# Patient Record
Sex: MALE | Race: WHITE | NOT HISPANIC OR LATINO | Employment: OTHER | URBAN - METROPOLITAN AREA
[De-identification: names, ages, dates, MRNs, and addresses within clinical notes are randomized per-mention and may not be internally consistent; named-entity substitution may affect disease eponyms.]

---

## 2018-11-28 ENCOUNTER — HOSPITAL ENCOUNTER (OUTPATIENT)
Dept: GENERAL RADIOLOGY | Facility: HOSPITAL | Age: 73
Discharge: HOME | End: 2018-11-28

## 2019-07-28 ENCOUNTER — HOSPITAL ENCOUNTER (EMERGENCY)
Facility: HOSPITAL | Age: 74
Discharge: HOME | End: 2019-07-28
Admitting: EMERGENCY MEDICINE

## 2019-07-28 ENCOUNTER — APPOINTMENT (OUTPATIENT)
Dept: GENERAL RADIOLOGY | Facility: HOSPITAL | Age: 74
End: 2019-07-28

## 2019-07-28 VITALS
OXYGEN SATURATION: 96 % | RESPIRATION RATE: 18 BRPM | HEIGHT: 72 IN | DIASTOLIC BLOOD PRESSURE: 88 MMHG | TEMPERATURE: 98.3 F | BODY MASS INDEX: 29.74 KG/M2 | WEIGHT: 219.58 LBS | SYSTOLIC BLOOD PRESSURE: 168 MMHG | HEART RATE: 71 BPM

## 2019-07-28 DIAGNOSIS — S86.911A KNEE STRAIN, RIGHT, INITIAL ENCOUNTER: Primary | ICD-10-CM

## 2019-07-28 PROCEDURE — 73564 X-RAY EXAM KNEE 4 OR MORE: CPT

## 2019-07-28 PROCEDURE — 99283 EMERGENCY DEPT VISIT LOW MDM: CPT

## 2023-04-27 ENCOUNTER — APPOINTMENT (OUTPATIENT)
Dept: GENERAL RADIOLOGY | Facility: HOSPITAL | Age: 78
End: 2023-04-27
Payer: MEDICARE

## 2023-04-27 ENCOUNTER — HOSPITAL ENCOUNTER (EMERGENCY)
Facility: HOSPITAL | Age: 78
Discharge: HOME | End: 2023-04-27
Attending: EMERGENCY MEDICINE
Payer: MEDICARE

## 2023-04-27 VITALS
SYSTOLIC BLOOD PRESSURE: 140 MMHG | DIASTOLIC BLOOD PRESSURE: 68 MMHG | OXYGEN SATURATION: 99 % | BODY MASS INDEX: 32 KG/M2 | RESPIRATION RATE: 17 BRPM | HEIGHT: 69 IN | WEIGHT: 216.05 LBS | TEMPERATURE: 99 F | HEART RATE: 91 BPM

## 2023-04-27 DIAGNOSIS — S80.01XA CONTUSION OF RIGHT KNEE, INITIAL ENCOUNTER: ICD-10-CM
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
## 2023-04-27 DIAGNOSIS — S50.02XA CONTUSION OF LEFT ELBOW, INITIAL ENCOUNTER: Primary | ICD-10-CM

## 2023-04-27 PROCEDURE — 99282 EMERGENCY DEPT VISIT SF MDM: CPT

## 2023-04-27 PROCEDURE — 90715 TDAP VACCINE 7 YRS/> IM: CPT | Performed by: NURSE PRACTITIONER

## 2023-04-27 PROCEDURE — 73070 X-RAY EXAM OF ELBOW: CPT

## 2023-04-27 PROCEDURE — 90471 IMMUNIZATION ADMIN: CPT | Performed by: NURSE PRACTITIONER

## 2023-04-27 PROCEDURE — 25010000002 TETANUS-DIPHTH-ACELL PERTUSSIS 5-2.5-18.5 LF-MCG/0.5 SUSPENSION PREFILLED SYRINGE: Performed by: NURSE PRACTITIONER

## 2023-04-27 RX ORDER — ACETAMINOPHEN 325 MG/1
650 TABLET ORAL ONCE
Status: DISCONTINUED | OUTPATIENT
Start: 2023-04-27 | End: 2023-04-27 | Stop reason: HOSPADM

## 2023-04-27 RX ADMIN — TETANUS TOXOID, REDUCED DIPHTHERIA TOXOID AND ACELLULAR PERTUSSIS VACCINE, ADSORBED 0.5 ML: 5; 2.5; 8; 8; 2.5 SUSPENSION INTRAMUSCULAR at 12:23

## 2023-04-27 NOTE — ED NOTES
Pt. Presents with c/o left upper extremity pain and swelling after tripping and falling. Denies hitting head and blood thinners.

## 2023-04-27 NOTE — DISCHARGE INSTRUCTIONS
Use sling at home as needed.  Rotate Tylenol Motrin as needed for pain.  Apply ice every 2 hours while awake, for 20 minutes.  Perform exercises per handout.  Schedule follow-up with primary care for recheck.  If symptoms persist, schedule follow-up with orthopedist for further work-up and management.  Return to the ER for new or worsening symptoms.

## 2023-04-27 NOTE — ED PROVIDER NOTES
"Subjective   History of Present Illness  77-year-old male presents with diffuse left elbow pain status post fall yesterday.  Patient stated \"I had gotten a haircut and I reached my pocket to get my phone and tripped over the curb.\"  He denies any head injury, perispinal tenderness, LOC.  He reports that he was unable to move it yesterday but has regained some range of motion today.  He reports that it is more swollen than it had been yesterday.  He reports that he takes daily morphine for his arthritis.        Review of Systems    Past Medical History:   Diagnosis Date   • Arthritis    • Chronic pain    • Hypertension        No Known Allergies    Past Surgical History:   Procedure Laterality Date   • BRAIN SURGERY     • CARPAL TUNNEL RELEASE     • HERNIA REPAIR     • JOINT REPLACEMENT         No family history on file.    Social History     Socioeconomic History   • Marital status:    Tobacco Use   • Smoking status: Former   • Smokeless tobacco: Never   • Tobacco comments:     Quit in 1985   Vaping Use   • Vaping Use: Never used   Substance and Sexual Activity   • Alcohol use: Not Currently     Comment: Quit 1984 restarted the quit 2011   • Drug use: No   • Sexual activity: Defer           Objective   Physical Exam  Vitals and nursing note reviewed.   Constitutional:       General: He is not in acute distress.     Appearance: He is well-developed.   Musculoskeletal:         General: Tenderness present. No deformity. Normal range of motion.   Skin:     General: Skin is warm and dry.      Capillary Refill: Capillary refill takes less than 2 seconds.      Coloration: Skin is not pale.   Neurological:      Mental Status: He is alert and oriented to person, place, and time.      Sensory: No sensory deficit.      Motor: No abnormal muscle tone.   Psychiatric:         Behavior: Behavior normal.         Thought Content: Thought content normal.         Judgment: Judgment normal.         Procedures           ED " "Course  ED Course as of 04/27/23 1226   Thu Apr 27, 2023   1140 Awaiting imaging. [AL]      ED Course User Index  [AL] Katherine Ball, APRN                                           [facility]  Interpreted by radiologist as below:     XR ELBOW 2 VIEW LEFT    Result Date: 4/27/2023  Impression: 1. No left elbow fracture or dislocation is seen. 2. Posterior left elbow soft tissue swelling is present. 3. Moderate osteoarthritic changes within the left elbow. Electronically Signed: Jennifer Mannclifford  4/27/2023 11:51 AM EDT  Workstation ID: [id number]        /68   Pulse 91   Temp 99 °F (37.2 °C) (Skin)   Resp 17   Ht 175.3 cm (69\")   Wt 98 kg (216 lb 0.8 oz)   SpO2 99%   BMI 31.91 kg/m²      Lab Results (last 24 hours)     ** No results found for the last 24 hours. **           Medications   acetaminophen (TYLENOL) tablet 650 mg (650 mg Oral Not Given 4/27/23 1120)   Tetanus-Diphth-Acell Pertussis (BOOSTRIX) injection 0.5 mL (0.5 mL Intramuscular Given 4/27/23 1223)        Patient undressed and placed in gown for exam.  Appropriate PPE worn during patient exam.  Appropriate monitoring initiated. Patient is alert and oriented x3.  No acute distress noted.  Patient has point tenderness noted over the entire elbow joint.  There is moderate swelling and mild erythema noted.  Motor function sensation intact.  Distal pulses strong and equal bilaterally 2+.  Cap refill less than 2 seconds.  Patient takes morphine daily and was given 1 dose of Tylenol p.o.  Wound care given to contusions.  Tdap updated.  Ice pack applied.  Patient reports he has a sling at home.  He was given exercises to perform.  Encouraged apply ice every 2 hours while awake.    I reviewed chart 3/30/2021 patient was seen at urgent care for contact dermatitis. My radiology interpretation of the left elbow shows no effusion, dislocation, fracture. Differential Diagnoses, not all-inclusive and does not constitute entirety of all causes: Elbow fracture " dislocation, contusion.  Fracture dislocation ruled out by imaging.  Contusion verified by exam and absence of fracture    Disposition/Treatment: Discussed results with patient, verbalized understanding. Discussed reasons to return to the ER, patient verbalized understanding. Agreeable with plan of care. Patient was stable upon discharge.    Upon reassessment, patient is flesh tone warm and dry no acute distress noted.  Vital signs are stable.    Part of this note may be an electronic transcription/translation of spoken language to printed text using the Dragon Dictation System.           Final diagnoses:   Contusion of left elbow, initial encounter   Fall, initial encounter   Contusion of right knee, initial encounter       ED Disposition  ED Disposition     ED Disposition   Discharge    Condition   Stable    Comment   --             Scott Chacon MD  [address] [place] [address] [phone]    Schedule an appointment as soon as possible for a visit       Sandeep Liz II, MD  [address] [facility] [place] [id number] [phone]    Schedule an appointment as soon as possible for a visit       [facility] EMERGENCY DEPARTMENT  [address] [facility] [address] [phone]  Go to   If symptoms worsen         Medication List      No changes were made to your prescriptions during this visit.          Katherine Ball, APRN  04/27/23 1226